# Patient Record
Sex: MALE | Race: WHITE | NOT HISPANIC OR LATINO | Employment: PART TIME | URBAN - NONMETROPOLITAN AREA
[De-identification: names, ages, dates, MRNs, and addresses within clinical notes are randomized per-mention and may not be internally consistent; named-entity substitution may affect disease eponyms.]

---
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
## 2021-01-20 ENCOUNTER — HOSPITAL ENCOUNTER (EMERGENCY)
Age: 21
Discharge: HOME | End: 2021-01-20
Payer: COMMERCIAL

## 2021-01-20 ENCOUNTER — APPOINTMENT (OUTPATIENT)
Dept: GENERAL RADIOLOGY | Age: 21
End: 2021-01-20
Payer: COMMERCIAL

## 2021-01-20 VITALS
RESPIRATION RATE: 19 BRPM | HEART RATE: 89 BPM | OXYGEN SATURATION: 94 % | TEMPERATURE: 97.9 F | WEIGHT: 200 LBS | BODY MASS INDEX: 31.32 KG/M2 | SYSTOLIC BLOOD PRESSURE: 154 MMHG | DIASTOLIC BLOOD PRESSURE: 90 MMHG

## 2021-01-20 DIAGNOSIS — S62.324A CLOSED DISPLACED FRACTURE OF SHAFT OF FOURTH METACARPAL BONE OF RIGHT HAND, INITIAL ENCOUNTER: Primary | ICD-10-CM

## 2021-01-20 PROCEDURE — 73130 X-RAY EXAM OF HAND: CPT

## 2021-01-20 PROCEDURE — 96372 THER/PROPH/DIAG INJ SC/IM: CPT

## 2021-01-20 PROCEDURE — 99282 EMERGENCY DEPT VISIT SF MDM: CPT

## 2021-01-20 PROCEDURE — 6360000002 HC RX W HCPCS: Performed by: PHYSICIAN ASSISTANT

## 2021-01-20 PROCEDURE — 29125 APPL SHORT ARM SPLINT STATIC: CPT

## 2021-01-20 RX ORDER — DEXAMETHASONE SODIUM PHOSPHATE 4 MG/ML
4 INJECTION, SOLUTION INTRA-ARTICULAR; INTRALESIONAL; INTRAMUSCULAR; INTRAVENOUS; SOFT TISSUE ONCE
Status: COMPLETED | OUTPATIENT
Start: 2021-01-20 | End: 2021-01-20

## 2021-01-20 RX ORDER — HYDROCODONE BITARTRATE AND ACETAMINOPHEN 10; 325 MG/1; MG/1
1 TABLET ORAL EVERY 6 HOURS PRN
Qty: 12 TABLET | Refills: 0 | Status: SHIPPED | OUTPATIENT
Start: 2021-01-20 | End: 2021-01-23

## 2021-01-20 RX ORDER — KETOROLAC TROMETHAMINE 30 MG/ML
60 INJECTION, SOLUTION INTRAMUSCULAR; INTRAVENOUS ONCE
Status: COMPLETED | OUTPATIENT
Start: 2021-01-20 | End: 2021-01-20

## 2021-01-20 RX ADMIN — KETOROLAC TROMETHAMINE 60 MG: 30 INJECTION, SOLUTION INTRAMUSCULAR; INTRAVENOUS at 16:22

## 2021-01-20 RX ADMIN — DEXAMETHASONE SODIUM PHOSPHATE 4 MG: 4 INJECTION, SOLUTION INTRAMUSCULAR; INTRAVENOUS at 16:21

## 2021-01-20 ASSESSMENT — ENCOUNTER SYMPTOMS
APNEA: 0
COLOR CHANGE: 0
BACK PAIN: 0
EYE DISCHARGE: 0
SHORTNESS OF BREATH: 0
COUGH: 0
PHOTOPHOBIA: 0
EYE ITCHING: 0

## 2021-01-20 NOTE — ED PROVIDER NOTES
[facility]  eMERGENCYdEPARTMENT eNCOUnter      Pt Name: Bobbi Reyes  MRN: [id number]  [place] 2000  Date of evaluation: 1/20/2021  Provider:RIGO Anderson    CHIEF COMPLAINT       Chief Complaint   Patient presents with    Hand Injury     Right; punched a wall today         HISTORY OF PRESENT ILLNESS  (Location/Symptom, Timing/Onset, Context/Setting, Quality, Duration, Modifying Factors, Severity.)   Bobbi Reyes is a 24 y.o. male who presents to the emergency department with complaints of right hand pain after punching wall. Concern for fracture at 4th/ring metacarpal pain 10/10 worse with movement. Feels no weakness but limitation to movement due to pain. HPI    Nursing Notes were reviewed and I agree. REVIEW OF SYSTEMS    (2-9 systems for level 4, 10 or more for level 5)     Review of Systems   Constitutional: Negative for activity change, appetite change, chills and fever. HENT: Negative for congestion and dental problem. Eyes: Negative for photophobia, discharge and itching. Respiratory: Negative for apnea, cough and shortness of breath. Cardiovascular: Negative for chest pain. Musculoskeletal: Positive for arthralgias and joint swelling. Negative for back pain, gait problem, myalgias and neck pain. Skin: Negative for color change, pallor and rash. Neurological: Negative for dizziness, seizures and syncope. Psychiatric/Behavioral: Negative for agitation. The patient is not nervous/anxious. Except as noted above the remainder of the review of systems was reviewed and negative. PAST MEDICAL HISTORY   History reviewed. No pertinent past medical history. SURGICAL HISTORY       Past Surgical History:   Procedure Laterality Date    WISDOM TOOTH EXTRACTION           CURRENT MEDICATIONS       Discharge Medication List as of 1/20/2021  6:11 PM          ALLERGIES     Patient has no known allergies.     FAMILY HISTORY       Family History Problem Relation Age of Onset    High Cholesterol Mother     Bipolar Disorder Father     ADHD Father           SOCIAL HISTORY       Social History     Socioeconomic History    Marital status: Single     Spouse name: None    Number of children: None    Years of education: None    Highest education level: None   Occupational History    None   Social Needs    Financial resource strain: None    Food insecurity     Worry: None     Inability: None    Transportation needs     Medical: None     Non-medical: None   Tobacco Use    Smoking status: Current Some Day Smoker    Smokeless tobacco: Current User   Substance and Sexual Activity    Alcohol use: Yes     Alcohol/week: 0.0 standard drinks     Comment: occas.  Drug use: Yes     Types: Marijuana    Sexual activity: None   Lifestyle    Physical activity     Days per week: None     Minutes per session: None    Stress: None   Relationships    Social connections     Talks on phone: None     Gets together: None     Attends [employer] service: None     Active member of club or organization: None     Attends meetings of clubs or organizations: None     Relationship status: None    Intimate partner violence     Fear of current or ex partner: None     Emotionally abused: None     Physically abused: None     Forced sexual activity: None   Other Topics Concern    None   Social History Narrative    None       SCREENINGS           PHYSICAL EXAM    (up to 7 forlevel 4, 8 or more for level 5)     ED Triage Vitals [01/20/21 1553]   BP Temp Temp src Pulse Resp SpO2 Height Weight   (!) 154/90 97.9 °F (36.6 °C) -- 89 19 94 % -- 200 lb (90.7 kg)       Physical Exam  Vitals signs and nursing note reviewed. Constitutional:       General: He is not in acute distress. Appearance: Normal appearance. He is well-developed. He is not diaphoretic. HENT:      Head: Normocephalic and atraumatic.       Right Ear: Tympanic membrane, ear canal and external ear normal. Signed by Dr Danielle Diane on 1/20/2021 4:35 PM          LABS:  Labs Reviewed - No data to display    All other labs were within normal range or notreturned as of this dictation. RE-ASSESSMENT        EMERGENCY DEPARTMENT COURSE and DIFFERENTIAL DIAGNOSIS/MDM:   Vitals:    Vitals:    01/20/21 1553   BP: (!) 154/90   Pulse: 89   Resp: 19   Temp: 97.9 °F (36.6 °C)   SpO2: 94%   Weight: 200 lb (90.7 kg)       MDM  I have made Dr. Shilpa Kellogg with orthopedics aware he wants the patient placed in an ulnar gutter splint and to see him tomorrow in the office. My attending Dr. Bere Ugarte has done any prescription for some Norco for pain control as needed. PROCEDURES:    Splint Application    Date/Time: 1/20/2021 11:49 PM  Performed by: RIGO Tobias  Authorized by: RIGO Tobias     Consent:     Consent obtained:  Verbal    Consent given by:  Patient  Pre-procedure details:     Sensation:  Normal  Procedure details:     Laterality:  Right    Location:  Hand    Hand:  R hand    Strapping: yes      Cast type:  Short arm    Splint type:  Ulnar gutter    Supplies:  Ortho-Glass  Post-procedure details:     Pain:  Improved    Sensation:  Normal    Patient tolerance of procedure: Tolerated well, no immediate complications          FINAL IMPRESSION      1. Closed displaced fracture of shaft of fourth metacarpal bone of right hand, initial encounter          DISPOSITION/PLAN   DISPOSITION Decision To Discharge 01/20/2021 06:10:42 PM      PATIENT REFERRED TO:  No follow-up provider specified. DISCHARGE MEDICATIONS:  Discharge Medication List as of 1/20/2021  6:11 PM      START taking these medications    Details   HYDROcodone-acetaminophen (LORCET HD)  MG per tablet Take 1 tablet by mouth every 6 hours as needed for Pain for up to 3 days. May cause drowsiness.   Do not take and drive., YOUH-04 tablet, R-0Normal (Please note that portions of this note were completed with a voice recognition program.  Efforts were made to edit the dictations but occasionallywords are mis-transcribed.)    Shellye Lynne [facility], [place]  01/20/21 4962

## 2021-05-13 ENCOUNTER — HOSPITAL ENCOUNTER (EMERGENCY)
Age: 21
Discharge: HOME | End: 2021-05-13
Payer: COMMERCIAL

## 2021-05-13 VITALS
DIASTOLIC BLOOD PRESSURE: 82 MMHG | BODY MASS INDEX: 31.08 KG/M2 | HEART RATE: 88 BPM | RESPIRATION RATE: 19 BRPM | HEIGHT: 67 IN | TEMPERATURE: 98.2 F | WEIGHT: 198 LBS | SYSTOLIC BLOOD PRESSURE: 139 MMHG | OXYGEN SATURATION: 95 %

## 2021-05-13 DIAGNOSIS — T14.90XD HEALING WOUND: ICD-10-CM

## 2021-05-13 DIAGNOSIS — Z51.89 VISIT FOR WOUND CHECK: Primary | ICD-10-CM

## 2021-05-13 PROCEDURE — 90471 IMMUNIZATION ADMIN: CPT | Performed by: NURSE PRACTITIONER

## 2021-05-13 PROCEDURE — 90715 TDAP VACCINE 7 YRS/> IM: CPT | Performed by: NURSE PRACTITIONER

## 2021-05-13 PROCEDURE — 99283 EMERGENCY DEPT VISIT LOW MDM: CPT

## 2021-05-13 PROCEDURE — 6360000002 HC RX W HCPCS: Performed by: NURSE PRACTITIONER

## 2021-05-13 RX ADMIN — TETANUS TOXOID, REDUCED DIPHTHERIA TOXOID AND ACELLULAR PERTUSSIS VACCINE, ADSORBED 0.5 ML: 5; 2.5; 8; 8; 2.5 SUSPENSION INTRAMUSCULAR at 19:57

## 2021-05-14 NOTE — ED PROVIDER NOTES
[facility]  eMERGENCY dEPARTMENT eNCOUnter      Pt Name: Bj Lan  MRN: [id number]  [place] 2000  Date of evaluation: 5/13/2021  Provider: MAGGIE Yan    CHIEF COMPLAINT       Chief Complaint   Patient presents with    Laceration         HISTORY OF PRESENT ILLNESS   (Location/Symptom, Timing/Onset,Context/Setting, Quality, Duration, Modifying Factors, Severity)  Note limiting factors. Andre Feliciano a 24 y.o. male who presents to the emergency department for evaluation of wound left hand. Pt tells me that he sustained wound to top of non dominant/left hand 2 days ago. He relates that the top of his hand was scraped by sharp edge of his dog's collar. He tells me that he is here for tetanus immunization up date. He denies fevers as well as constitutional symptoms of illness. He does not endorse any left hand pain, numbness or weakness. He tells me that wound continues to heal.   HPI    Nursing Notes were reviewed. REVIEW OF SYSTEMS    (2-9 systems for level 4, 10 or more for level 5)     Review of Systems   Constitutional: Negative for fever. Skin: Positive for wound (left hand). A complete review of systems was performed and is negative except as noted above in the HPI. PAST MEDICAL HISTORY   History reviewed. No pertinent past medical history. SURGICAL HISTORY       Past Surgical History:   Procedure Laterality Date    HAND SURGERY Right     WISDOM TOOTH EXTRACTION           CURRENT MEDICATIONS       There are no discharge medications for this patient. ALLERGIES     Patient has no known allergies.     FAMILY HISTORY       Family History   Problem Relation Age of Onset    High Cholesterol Mother     Bipolar Disorder Father     ADHD Father           SOCIAL HISTORY       Social History     Socioeconomic History    Marital status: Single     Spouse name: None    Number of children: None    Years of education: None    Highest education level: None are interpreted by the Emergency Department Physician who either signs or Co-signs this chart in the absence of acardiologist.        RADIOLOGY:   Non-plain film images such as CT, Ultrasound andMRI are read by the radiologist. Plain radiographic images are visualized and preliminarily interpreted by the emergency physician with the below findings:        Interpretation per the Radiologist below, if available at the time of this note:    No orders to display         ED BEDSIDE ULTRASOUND:   Performed by ED Physician - none    LABS:  Labs Reviewed - No data to display    All other labs were within normal range or not returned as of this dictation. RE-ASSESSMENT           EMERGENCY DEPARTMENT COURSE and DIFFERENTIALDIAGNOSIS/MDM:   Vitals:    Vitals:    05/13/21 1952   BP: 139/82   Pulse: 88   Resp: 19   Temp: 98.2 °F (36.8 °C)   SpO2: 95%   Weight: 198 lb (89.8 kg)   Height: 5' 7\" (1.702 m)       MDM      CONSULTS:  None    PROCEDURES:  Unless otherwise notedbelow, none     Procedures    FINAL IMPRESSION     1. Visit for wound check    2. Healing wound          DISPOSITION/PLAN   DISPOSITION Decision To Discharge 05/13/2021 08:22:41 PM      PATIENT REFERRED TO:  [address] [place] [address]  [phone]  Schedule an appointment as soon as possible for a visit   As needed      DISCHARGE MEDICATIONS:       There are no discharge medications for this patient.       (Pleasenote that portions of this note were completed with a voice recognition program.  Efforts were made to edit the dictations but occasionally words are mis-transcribed.)              Eduardo Troy, MAGGIE  05/13/21 2056

## 2021-05-30 ENCOUNTER — HOSPITAL ENCOUNTER (EMERGENCY)
Age: 21
Discharge: HOME | End: 2021-05-30
Payer: COMMERCIAL

## 2021-05-30 VITALS
HEIGHT: 67 IN | BODY MASS INDEX: 29.82 KG/M2 | TEMPERATURE: 98.1 F | DIASTOLIC BLOOD PRESSURE: 76 MMHG | RESPIRATION RATE: 20 BRPM | WEIGHT: 190 LBS | HEART RATE: 89 BPM | OXYGEN SATURATION: 97 % | SYSTOLIC BLOOD PRESSURE: 134 MMHG

## 2021-05-30 DIAGNOSIS — K13.70 UNSPECIFIED LESIONS OF ORAL MUCOSA: ICD-10-CM

## 2021-05-30 DIAGNOSIS — R09.82 POST-NASAL DRAINAGE: Primary | ICD-10-CM

## 2021-05-30 LAB — S PYO AG THROAT QL: NEGATIVE

## 2021-05-30 PROCEDURE — 99283 EMERGENCY DEPT VISIT LOW MDM: CPT

## 2021-05-30 PROCEDURE — 87880 STREP A ASSAY W/OPTIC: CPT

## 2021-05-30 PROCEDURE — 87081 CULTURE SCREEN ONLY: CPT

## 2021-05-30 RX ORDER — CLINDAMYCIN HYDROCHLORIDE 300 MG/1
300 CAPSULE ORAL 2 TIMES DAILY
Qty: 14 CAPSULE | Refills: 0 | Status: SHIPPED | OUTPATIENT
Start: 2021-05-30 | End: 2021-06-06

## 2021-05-31 ASSESSMENT — ENCOUNTER SYMPTOMS
APNEA: 0
SHORTNESS OF BREATH: 0
COLOR CHANGE: 0
BACK PAIN: 0
EYE DISCHARGE: 0
EYE ITCHING: 0
PHOTOPHOBIA: 0
SORE THROAT: 1
COUGH: 0

## 2021-05-31 NOTE — ED PROVIDER NOTES
[facility]  eMERGENCYdEPARTMENT eNCOUnter      Pt Name: Rhett Kaplan  MRN: [id number]  [place] 2000  Date of evaluation: 5/30/2021  Provider:RIGO Anderson    CHIEF COMPLAINT       Chief Complaint   Patient presents with    Mouth Lesions         HISTORY OF PRESENT ILLNESS  (Location/Symptom, Timing/Onset, Context/Setting, Quality, Duration, Modifying Factors, Severity.)   Rhett Kaplan is a 24 y.o. male who presents to the emergency department with complaints of oral lesions noticed a few days ago they are above both canines bilaterally mildly sore patient admits to sore throat with concern for peritonsillar infection. HPI    Nursing Notes were reviewed and I agree. REVIEW OF SYSTEMS    (2-9 systems for level 4, 10 or more for level 5)     Review of Systems   Constitutional: Negative for activity change, appetite change, chills and fever. HENT: Positive for sore throat. Negative for congestion and dental problem. Eyes: Negative for photophobia, discharge and itching. Respiratory: Negative for apnea, cough and shortness of breath. Cardiovascular: Negative for chest pain. Musculoskeletal: Negative for arthralgias, back pain, gait problem, myalgias and neck pain. Skin: Negative for color change, pallor and rash. Neurological: Negative for dizziness, seizures and syncope. Psychiatric/Behavioral: Negative for agitation. The patient is not nervous/anxious. Except as noted above the remainder of the review of systems was reviewed and negative. PAST MEDICAL HISTORY   History reviewed. No pertinent past medical history. SURGICAL HISTORY       Past Surgical History:   Procedure Laterality Date    HAND SURGERY Right     WISDOM TOOTH EXTRACTION           CURRENT MEDICATIONS       Discharge Medication List as of 5/30/2021  9:09 PM          ALLERGIES     Patient has no known allergies.     FAMILY HISTORY       Family History   Problem Relation Age of Onset    High Cholesterol Mother     Bipolar Disorder Father     ADHD Father           SOCIAL HISTORY       Social History     Socioeconomic History    Marital status: Single     Spouse name: None    Number of children: None    Years of education: None    Highest education level: None   Occupational History    None   Tobacco Use    Smoking status: Current Some Day Smoker    Smokeless tobacco: Current User   Substance and Sexual Activity    Alcohol use: Yes     Alcohol/week: 0.0 standard drinks     Comment: occas.  Drug use: Yes     Types: Marijuana    Sexual activity: None   Other Topics Concern    None   Social History Narrative    None     Social Determinants of Health     Financial Resource Strain:     Difficulty of Paying Living Expenses:    Food Insecurity:     Worried About Running Out of Food in the Last Year:     [address] in the Last Year:    Transportation Needs:     Lack of Transportation (Medical):  Lack of Transportation (Non-Medical):    Physical Activity:     Days of Exercise per Week:     Minutes of Exercise per Session:    Stress:     Feeling of Stress :    Social Connections:     Frequency of Communication with Friends and Family:     Frequency of Social Gatherings with Friends and Family:     Attends [employer] Services:     Active Member of Clubs or Organizations:     Attends Club or Organization Meetings:     Marital Status:    Intimate Partner Violence:     Fear of Current or Ex-Partner:     Emotionally Abused:     Physically Abused:     Sexually Abused:        SCREENINGS           PHYSICAL EXAM    (up to 7 forlevel 4, 8 or more for level 5)     ED Triage Vitals [05/30/21 2016]   BP Temp Temp src Pulse Resp SpO2 Height Weight   134/76 98.1 °F (36.7 °C) -- 89 20 97 % 5' 7\" (1.702 m) 190 lb (86.2 kg)       Physical Exam  Vitals and nursing note reviewed. Constitutional:       General: He is not in acute distress. Appearance: Normal appearance.  He is

## 2021-06-01 LAB — S PYO THROAT QL CULT: NORMAL

## 2021-06-13 ENCOUNTER — OFFICE VISIT (OUTPATIENT)
Age: 21
End: 2021-06-13

## 2021-06-13 ENCOUNTER — OFFICE VISIT (OUTPATIENT)
Dept: URGENT CARE | Age: 21
End: 2021-06-13
Payer: COMMERCIAL

## 2021-06-13 VITALS
TEMPERATURE: 98 F | RESPIRATION RATE: 16 BRPM | OXYGEN SATURATION: 98 % | BODY MASS INDEX: 30.64 KG/M2 | HEART RATE: 105 BPM | WEIGHT: 195.2 LBS | DIASTOLIC BLOOD PRESSURE: 72 MMHG | HEIGHT: 67 IN | SYSTOLIC BLOOD PRESSURE: 122 MMHG

## 2021-06-13 DIAGNOSIS — Z11.59 SCREENING FOR VIRAL DISEASE: Primary | ICD-10-CM

## 2021-06-13 DIAGNOSIS — J06.9 URI WITH COUGH AND CONGESTION: Primary | ICD-10-CM

## 2021-06-13 PROCEDURE — 99213 OFFICE O/P EST LOW 20 MIN: CPT | Performed by: NURSE PRACTITIONER

## 2021-06-13 PROCEDURE — 99999 PR OFFICE/OUTPT VISIT,PROCEDURE ONLY: CPT | Performed by: NURSE PRACTITIONER

## 2021-06-13 ASSESSMENT — ENCOUNTER SYMPTOMS
SORE THROAT: 0
SHORTNESS OF BREATH: 1
COUGH: 1

## 2021-06-13 NOTE — LETTER
[facility]  [address] [place] [address] [address] [id number]  Phone: [phone]  Fax: MAGGIE Rocha        June 13, 2021     Patient: Rhett Kaplan   YOB: 2000   Date of Visit: 6/13/2021       To Whom it May Concern:    Brinda Ndiaye was seen in my clinic on 6/13/2021. He may return to work on 06/15/2021. If you have any questions or concerns, please don't hesitate to call.     Sincerely,         MAGGIE Juarez

## 2021-06-13 NOTE — PATIENT INSTRUCTIONS
Patient Education        Upper Respiratory Infection (Cold): Care Instructions  Your Care Instructions     An upper respiratory infection, or URI, is an infection of the nose, sinuses, or throat. URIs are spread by coughs, sneezes, and direct contact. The common cold is the most frequent kind of URI. The flu and sinus infections are other kinds of URIs. Almost all URIs are caused by viruses. Antibiotics won't cure them. But you can treat most infections with home care. This may include drinking lots of fluids and taking over-the-counter pain medicine. You will probably feel better in 4 to 10 days. The doctor has checked you carefully, but problems can develop later. If you notice any problems or new symptoms, get medical treatment right away. Follow-up care is a key part of your treatment and safety. Be sure to make and go to all appointments, and call your doctor if you are having problems. It's also a good idea to know your test results and keep a list of the medicines you take. How can you care for yourself at home? · To prevent dehydration, drink plenty of fluids. Choose water and other caffeine-free clear liquids until you feel better. If you have kidney, heart, or liver disease and have to limit fluids, talk with your doctor before you increase the amount of fluids you drink. · Take an over-the-counter pain medicine, such as acetaminophen (Tylenol), ibuprofen (Advil, Motrin), or naproxen (Aleve). Read and follow all instructions on the label. · Before you use cough and cold medicines, check the label. These medicines may not be safe for young children or for people with certain health problems. · Be careful when taking over-the-counter cold or flu medicines and Tylenol at the same time. Many of these medicines have acetaminophen, which is Tylenol. Read the labels to make sure that you are not taking more than the recommended dose. Too much acetaminophen (Tylenol) can be harmful.   · Get plenty of respiratory syndrome (MERS) and severe acute respiratory syndrome (SARS). COVID-19 is caused by a novel coronavirus. That means it's a new type that has not been seen in people before. What are the symptoms? Coronavirus (COVID-19) symptoms may include:  · Fever. · Cough. · Trouble breathing. · Chills or repeated shaking with chills. · Muscle pain. · Headache. · Sore throat. · New loss of taste or smell. · Vomiting. · Diarrhea. In severe cases, COVID-19 can cause pneumonia and make it hard to breathe without help from a machine. It can cause death. How is it diagnosed? COVID-19 is diagnosed with a viral test. This may also be called a PCR test or antigen test. It looks for evidence of the virus in your breathing passages or lungs (respiratory system). The test is most often done on a sample from the nose, throat, or lungs. It's sometimes done on a sample of saliva. One way a sample is collected is by putting a long swab into the back of your nose. How is it treated? Mild cases of COVID-19 can be treated at home. Serious cases need treatment in the hospital. Treatment may include medicines to reduce symptoms, plus breathing support such as oxygen therapy or a ventilator. Some people may be placed on their belly to help their oxygen levels. Treatments that may help people who have COVID-19 include:  Antiviral medicines. These medicines treat viral infections. Remdesivir is an example. Immune-based therapy. These medicines help the immune system fight COVID-19. One example is bamlanivimab. It's a monoclonal antibody. Blood thinners. These medicines help prevent blood clots. People with severe illness are at risk for blood clots. How can you protect yourself and others? The best way to protect yourself from getting sick is to:  · Avoid areas where there is an outbreak. · Avoid contact with people who may be infected. · Avoid crowds and try to stay at least 6 feet away from other people.   · Wash your hands often, especially after you cough or sneeze. Use soap and water, and scrub for at least 20 seconds. If soap and water aren't available, use an alcohol-based hand . · Avoid touching your mouth, nose, and eyes. To help avoid spreading the virus to others:  · Stay home if you are sick or have been exposed to the virus. Don't go to school, work, or public areas. And don't use public transportation, ride-shares, or taxis unless you have no choice. · Wear a cloth face cover if you have to go to public areas. · Cover your mouth with a tissue when you cough or sneeze. Then throw the tissue in the trash and wash your hands right away. · If you're sick:  ? Leave your home only if you need to get medical care. But call the doctor's office first so they know you're coming. And wear a face cover. ? Wear the face cover whenever you're around other people. It can help stop the spread of the virus. ? Limit contact with pets and people in your home. If possible, stay in a separate bedroom and use a separate bathroom. ? Clean and disinfect your home every day. Use household  and disinfectant wipes or sprays. Take special care to clean things that you grab with your hands. These include doorknobs, remote controls, phones, and handles on your refrigerator and microwave. And don't forget countertops, tabletops, bathrooms, and computer keyboards. When should you call for help? Call 911 anytime you think you may need emergency care. For example, call if you have life-threatening symptoms, such as:    · You have severe trouble breathing. (You can't talk at all.)     · You have constant chest pain or pressure.     · You are severely dizzy or lightheaded.     · You are confused or can't think clearly.     · Your face and lips have a blue color.     · You pass out (lose consciousness) or are very hard to wake up.    Call your doctor now or seek immediate medical care if:    · You have moderate trouble breathing. (You can't speak a full sentence.)     · You are coughing up blood (more than about 1 teaspoon).     · You have signs of low blood pressure. These include feeling lightheaded; being too weak to stand; and having cold, pale, clammy skin. Watch closely for changes in your health, and be sure to contact your doctor if:    · Your symptoms get worse.     · You are not getting better as expected. Call before you go to the doctor's office. Follow their instructions. And wear a cloth face cover. Current as of: February 19, 2021               Content Version: 12.8  © 2145-8120 Healthwise, [employer]. Care instructions adapted under license by [facility] ([facility]). If you have questions about a medical condition or this instruction, always ask your healthcare professional. [address] any warranty or liability for your use of this information. 1. Rest and increase fluid intake. 2. Covid-19 testing. Quarantine at home until results are called to you. If positive you will have to quarantine for 14days. If positive the health dept will also contact you. 3. Monitor for fever and treat as needed with tylenol or ibuprofen  4. If patient is not improving or developing any new/worsening symptoms then return to clinic as needed or go to ER. Patient is to follow up with PCP as needed.

## 2021-06-13 NOTE — PROGRESS NOTES
[address] [facility]  [address] [address] [place] [address]  Dept: [phone]  Dept Fax: [phone]  Loc: [phone]    Rhett Kaplan is a 24 y.o. male who presents today for his medical conditions/complaintsas noted below. Rhett Kaplan is c/o of Shortness of Breath (onset 1 week ago ), Chills (1-2 days ago ), and Congestion        HPI:     URI   This is a new problem. The current episode started in the past 7 days. The problem has been unchanged. There has been no fever. Associated symptoms include congestion and coughing. Pertinent negatives include no sore throat. Associated symptoms comments: SOB, chills  . He has tried nothing for the symptoms. Works at [employer]. Is concerned for covid-19. No past medical history on file. Past Surgical History:   Procedure Laterality Date    HAND SURGERY Right     WISDOM TOOTH EXTRACTION         Family History   Problem Relation Age of Onset    High Cholesterol Mother     Bipolar Disorder Father     ADHD Father        Social History     Tobacco Use    Smoking status: Current Some Day Smoker    Smokeless tobacco: Current User   Substance Use Topics    Alcohol use: Yes     Alcohol/week: 0.0 standard drinks     Comment: occas. No current outpatient medications on file. No current facility-administered medications for this visit.      No Known Allergies    Health Maintenance   Topic Date Due    Hepatitis C screen  Never done    Varicella vaccine (1 of 2 - 2-dose childhood series) Never done    Pneumococcal 0-64 years Vaccine (1 of 2 - PPSV23) Never done    HPV vaccine (1 - Male 2-dose series) Never done    COVID-19 Vaccine (1) Never done    HIV screen  Never done    Flu vaccine (Season Ended) 09/01/2021    DTaP/Tdap/Td vaccine (2 - Td or Tdap) 05/13/2031    Hepatitis A vaccine  Aged Out    Hepatitis B vaccine  Aged Out    Hib vaccine  Aged Out    Meningococcal (ACWY) vaccine  Aged Out Subjective:     Review of Systems   Constitutional: Positive for chills. Negative for fever. HENT: Positive for congestion. Negative for sore throat. Respiratory: Positive for cough and shortness of breath. All other systems reviewed and are negative.      :Objective      Physical Exam  Vitals and nursing note reviewed. Constitutional:       General: He is not in acute distress. Appearance: Normal appearance. He is well-developed. He is not ill-appearing or diaphoretic. HENT:      Head: Normocephalic and atraumatic. Right Ear: Tympanic membrane, ear canal and external ear normal.      Left Ear: Tympanic membrane, ear canal and external ear normal.      Nose: Nose normal.      Mouth/Throat:      Mouth: Mucous membranes are moist.      Pharynx: Oropharynx is clear. Eyes:      Conjunctiva/sclera: Conjunctivae normal.      Pupils: Pupils are equal, round, and reactive to light. Cardiovascular:      Rate and Rhythm: Normal rate and regular rhythm. Heart sounds: Normal heart sounds. No murmur heard. Pulmonary:      Effort: Pulmonary effort is normal. No respiratory distress. Breath sounds: Normal breath sounds. No wheezing, rhonchi or rales. Skin:     General: Skin is warm and dry. Findings: No rash. Neurological:      Mental Status: He is alert and oriented to person, place, and time. Psychiatric:         Mood and Affect: Mood normal.         Behavior: Behavior normal.       /72   Pulse 105   Temp 98 °F (36.7 °C) (Temporal)   Resp 16   Ht 5' 7\" (1.702 m)   Wt 195 lb 3.2 oz (88.5 kg)   SpO2 98%   BMI 30.57 kg/m²     :Assessment       Diagnosis Orders   1. URI with cough and congestion         :Plan   1. Rest and increase fluid intake. 2. Covid-19 testing. Quarantine at home until results are called to you. If positive you will have to quarantine for 14days. If positive the health dept will also contact you.   3. Monitor for fever and treat as needed with tylenol or ibuprofen  4. If patient is not improving or developing any new/worsening symptoms then return to clinic as needed or go to ER. Patient is to follow up with PCP as needed. No orders of the defined types were placed in this encounter. No follow-ups on file. No orders of the defined types were placed in this encounter. Patient given educational materials- see patient instructions. Discussed use, benefit, and side effects of prescribedmedications. All patient questions answered. Pt voiced understanding. Patient Instructions       Patient Education        Upper Respiratory Infection (Cold): Care Instructions  Your Care Instructions     An upper respiratory infection, or URI, is an infection of the nose, sinuses, or throat. URIs are spread by coughs, sneezes, and direct contact. The common cold is the most frequent kind of URI. The flu and sinus infections are other kinds of URIs. Almost all URIs are caused by viruses. Antibiotics won't cure them. But you can treat most infections with home care. This may include drinking lots of fluids and taking over-the-counter pain medicine. You will probably feel better in 4 to 10 days. The doctor has checked you carefully, but problems can develop later. If you notice any problems or new symptoms, get medical treatment right away. Follow-up care is a key part of your treatment and safety. Be sure to make and go to all appointments, and call your doctor if you are having problems. It's also a good idea to know your test results and keep a list of the medicines you take. How can you care for yourself at home? · To prevent dehydration, drink plenty of fluids. Choose water and other caffeine-free clear liquids until you feel better. If you have kidney, heart, or liver disease and have to limit fluids, talk with your doctor before you increase the amount of fluids you drink.   · Take an over-the-counter pain medicine, such as acetaminophen (Tylenol), ibuprofen (Advil, Motrin), or naproxen (Aleve). Read and follow all instructions on the label. · Before you use cough and cold medicines, check the label. These medicines may not be safe for young children or for people with certain health problems. · Be careful when taking over-the-counter cold or flu medicines and Tylenol at the same time. Many of these medicines have acetaminophen, which is Tylenol. Read the labels to make sure that you are not taking more than the recommended dose. Too much acetaminophen (Tylenol) can be harmful. · Get plenty of rest.  · Do not smoke or allow others to smoke around you. If you need help quitting, talk to your doctor about stop-smoking programs and medicines. These can increase your chances of quitting for good. When should you call for help? Call 911 anytime you think you may need emergency care. For example, call if:    · You have severe trouble breathing. Call your doctor now or seek immediate medical care if:    · You seem to be getting much sicker.     · You have new or worse trouble breathing.     · You have a new or higher fever.     · You have a new rash. Watch closely for changes in your health, and be sure to contact your doctor if:    · You have a new symptom, such as a sore throat, an earache, or sinus pain.     · You cough more deeply or more often, especially if you notice more mucus or a change in the color of your mucus.     · You do not get better as expected. Where can you learn more? Go to https://OmbitronjuanyDenty's.healthStarGen. org and sign in to your Maximus account. Enter E920 in the [facility] box to learn more about \"Upper Respiratory Infection (Cold): Care Instructions. \"     If you do not have an account, please click on the \"Sign Up Now\" link. Current as of: October 26, 2020               Content Version: 12.8  © 5229-6289 Healthwise, Incorporated. Care instructions adapted under license by [facility] ([facility]).  If you have questions about a medical condition or this instruction, always ask your healthcare professional. [address] [address] any warranty or liability for your use of this information. Patient Education        Learning About Coronavirus [phone]  What is coronavirus (COVID-19)? COVID-19 is a disease caused by a new type of coronavirus. This illness was first found in December 2019. It has since spread worldwide. Coronaviruses are a large group of viruses. They cause the common cold. They also cause more serious illnesses like Middle East respiratory syndrome (MERS) and severe acute respiratory syndrome (SARS). COVID-19 is caused by a novel coronavirus. That means it's a new type that has not been seen in people before. What are the symptoms? Coronavirus (COVID-19) symptoms may include:  · Fever. · Cough. · Trouble breathing. · Chills or repeated shaking with chills. · Muscle pain. · Headache. · Sore throat. · New loss of taste or smell. · Vomiting. · Diarrhea. In severe cases, COVID-19 can cause pneumonia and make it hard to breathe without help from a machine. It can cause death. How is it diagnosed? COVID-19 is diagnosed with a viral test. This may also be called a PCR test or antigen test. It looks for evidence of the virus in your breathing passages or lungs (respiratory system). The test is most often done on a sample from the nose, throat, or lungs. It's sometimes done on a sample of saliva. One way a sample is collected is by putting a long swab into the back of your nose. How is it treated? Mild cases of COVID-19 can be treated at home. Serious cases need treatment in the hospital. Treatment may include medicines to reduce symptoms, plus breathing support such as oxygen therapy or a ventilator. Some people may be placed on their belly to help their oxygen levels. Treatments that may help people who have COVID-19 include:  Antiviral medicines.    These medicines treat viral infections. Remdesivir is an example. Immune-based therapy. These medicines help the immune system fight COVID-19. One example is bamlanivimab. It's a monoclonal antibody. Blood thinners. These medicines help prevent blood clots. People with severe illness are at risk for blood clots. How can you protect yourself and others? The best way to protect yourself from getting sick is to:  · Avoid areas where there is an outbreak. · Avoid contact with people who may be infected. · Avoid crowds and try to stay at least 6 feet away from other people. · Wash your hands often, especially after you cough or sneeze. Use soap and water, and scrub for at least 20 seconds. If soap and water aren't available, use an alcohol-based hand . · Avoid touching your mouth, nose, and eyes. To help avoid spreading the virus to others:  · Stay home if you are sick or have been exposed to the virus. Don't go to school, work, or public areas. And don't use public transportation, ride-shares, or taxis unless you have no choice. · Wear a cloth face cover if you have to go to public areas. · Cover your mouth with a tissue when you cough or sneeze. Then throw the tissue in the trash and wash your hands right away. · If you're sick:  ? Leave your home only if you need to get medical care. But call the doctor's office first so they know you're coming. And wear a face cover. ? Wear the face cover whenever you're around other people. It can help stop the spread of the virus. ? Limit contact with pets and people in your home. If possible, stay in a separate bedroom and use a separate bathroom. ? Clean and disinfect your home every day. Use household  and disinfectant wipes or sprays. Take special care to clean things that you grab with your hands. These include doorknobs, remote controls, phones, and handles on your refrigerator and microwave.  And don't forget countertops, tabletops, bathrooms, and computer keyboards. When should you call for help? Call 911 anytime you think you may need emergency care. For example, call if you have life-threatening symptoms, such as:    · You have severe trouble breathing. (You can't talk at all.)     · You have constant chest pain or pressure.     · You are severely dizzy or lightheaded.     · You are confused or can't think clearly.     · Your face and lips have a blue color.     · You pass out (lose consciousness) or are very hard to wake up. Call your doctor now or seek immediate medical care if:    · You have moderate trouble breathing. (You can't speak a full sentence.)     · You are coughing up blood (more than about 1 teaspoon).     · You have signs of low blood pressure. These include feeling lightheaded; being too weak to stand; and having cold, pale, clammy skin. Watch closely for changes in your health, and be sure to contact your doctor if:    · Your symptoms get worse.     · You are not getting better as expected. Call before you go to the doctor's office. Follow their instructions. And wear a cloth face cover. Current as of: February 19, 2021               Content Version: 12.8  © 8152-9029 Healthwise, [email or web address]. Care instructions adapted under license by [facility] ([facility]). If you have questions about a medical condition or this instruction, always ask your healthcare professional. [address] [address] any warranty or liability for your use of this information. 1. Rest and increase fluid intake. 2. Covid-19 testing. Quarantine at home until results are called to you. If positive you will have to quarantine for 14days. If positive the health dept will also contact you. 3. Monitor for fever and treat as needed with tylenol or ibuprofen  4. If patient is not improving or developing any new/worsening symptoms then return to clinic as needed or go to ER. Patient is to follow up with PCP as needed.                Electronically signed by Dena Lewis

## 2021-06-14 LAB — SARS-COV-2, PCR: NOT DETECTED

## 2021-08-03 ENCOUNTER — OFFICE VISIT (OUTPATIENT)
Dept: URGENT CARE | Age: 21
End: 2021-08-03
Payer: COMMERCIAL

## 2021-08-03 VITALS
BODY MASS INDEX: 32.73 KG/M2 | RESPIRATION RATE: 18 BRPM | TEMPERATURE: 98.6 F | OXYGEN SATURATION: 97 % | SYSTOLIC BLOOD PRESSURE: 134 MMHG | HEART RATE: 81 BPM | DIASTOLIC BLOOD PRESSURE: 69 MMHG | WEIGHT: 209 LBS

## 2021-08-03 DIAGNOSIS — J30.9 ALLERGIC RHINITIS, UNSPECIFIED SEASONALITY, UNSPECIFIED TRIGGER: Primary | ICD-10-CM

## 2021-08-03 PROCEDURE — 99213 OFFICE O/P EST LOW 20 MIN: CPT | Performed by: NURSE PRACTITIONER

## 2021-08-03 RX ORDER — CETIRIZINE HYDROCHLORIDE 10 MG/1
10 TABLET ORAL DAILY
Qty: 30 TABLET | Refills: 0 | Status: SHIPPED | OUTPATIENT
Start: 2021-08-03 | End: 2021-09-02

## 2021-08-03 RX ORDER — GUAIFENESIN 100 MG/5ML
10 SYRUP ORAL EVERY 4 HOURS PRN
Qty: 1 BOTTLE | Refills: 0 | Status: SHIPPED | OUTPATIENT
Start: 2021-08-03

## 2021-08-03 ASSESSMENT — ENCOUNTER SYMPTOMS
EYE PAIN: 0
BACK PAIN: 0
VOMITING: 0
COUGH: 1
CHEST TIGHTNESS: 0
SHORTNESS OF BREATH: 0
SORE THROAT: 0
RHINORRHEA: 1
NAUSEA: 0
ABDOMINAL DISTENTION: 0
COLOR CHANGE: 0
EYE DISCHARGE: 0
ABDOMINAL PAIN: 0
WHEEZING: 0

## 2021-08-03 NOTE — LETTER
[id number] [facility]  [address] [place] [address] [address] [address]  Phone: [phone]  Fax: MAGGIE Gabriel CNP        August 3, 2021     Patient: Elizabeth Altamirano   YOB: 2000   Date of Visit: 8/3/2021       To Whom it May Concern:    Sarah Guerrero was seen in my clinic on 8/3/2021. He may return to work on 8/4/2021. If you have any questions or concerns, please don't hesitate to call.     Sincerely,         Marc Goltz, APRN - CNP

## 2021-08-03 NOTE — PATIENT INSTRUCTIONS
Take antihistamine as prescribed  Take Robitussin (decongestant) as prescribed  Increase fluids and rest  Return to clinic if symptoms worsen or fail to improve. Follow up with your primary care provider as needed      Patient Education        Managing Your Allergies: Care Instructions  Your Care Instructions     Managing your allergies is an important part of staying healthy. Your doctor will help you find out what may be the cause of the allergies. Common causes of symptoms are house dust and dust mites, animal dander, mold, and pollen. As soon as you know what triggers your symptoms, try to avoid those things. This can help prevent allergy symptoms, asthma, and other health problems. Ask your doctor about allergy medicine or immunotherapy. These treatments may help reduce or prevent allergy symptoms. Follow-up care is a key part of your treatment and safety. Be sure to make and go to all appointments, and call your doctor if you are having problems. It's also a good idea to know your test results and keep a list of the medicines you take. How can you care for yourself at home? · If you have been told by your doctor that dust or dust mites are causing your allergy, decrease the dust around your bed:  ? Wash sheets, pillowcases, and other bedding in hot water every week. ? Use dust-proof covers for pillows, duvets, and mattresses. Avoid plastic covers because they tear easily and do not \"breathe. \" Wash as instructed on the label. ? Do not use any blankets and pillows that you do not need. ? Use blankets that you can wash in your washing machine. ? Consider removing drapes and carpets, which attract and hold dust, from your bedroom. · If you are allergic to house dust and mites, do not use home humidifiers. Your doctor can suggest ways you can control dust and mites. · Look for signs of cockroaches. Cockroaches cause allergic reactions. Use cockroach baits to get rid of them.  Then, clean your home well. Cockroaches like areas where grocery bags, newspapers, empty bottles, or cardboard boxes are stored. Do not keep these inside your home, and keep trash and food containers sealed. Seal off any spots where cockroaches might enter your home. · If you are allergic to mold, get rid of furniture, rugs, and drapes that smell musty. Check for mold in the bathroom. · If you are allergic to outdoor pollen or mold spores, use air-conditioning. Change or clean all filters every month. Keep windows closed. · If you are allergic to pollen, stay inside when pollen counts are high. Use a vacuum  with a HEPA filter or a double-thickness filter at least two times each week. · Stay inside when air pollution is bad. Avoid paint fumes, perfumes, and other strong odors. · Avoid conditions that make your allergies worse. Stay away from smoke. Do not smoke or let anyone else smoke in your house. Do not use fireplaces or wood-burning stoves. · If you are allergic to your pets, change the air filter in your furnace every month. Use high-efficiency filters. · If you are allergic to pet dander, keep pets outside or out of your bedroom. Old carpet and cloth furniture can hold a lot of animal dander. You may need to replace them. When should you call for help? Give an epinephrine shot if:    · You think you are having a severe allergic reaction. After giving an epinephrine shot call 911, even if you feel better. Call 911 if:    · You have symptoms of a severe allergic reaction. These may include:  ? Sudden raised, red areas (hives) all over your body. ? Swelling of the throat, mouth, lips, or tongue. ? Trouble breathing. ? Passing out (losing consciousness). Or you may feel very lightheaded or suddenly feel weak, confused, or restless.     · You have been given an epinephrine shot, even if you feel better.    Call your doctor now or seek immediate medical care if:    · You have symptoms of an allergic reaction, such as:  ? A rash or hives (raised, red areas on the skin). ? Itching. ? Swelling. ? Belly pain, nausea, or vomiting. Watch closely for changes in your health, and be sure to contact your doctor if:    · Your allergies get worse.     · You need help controlling your allergies.     · You have questions about allergy testing.     · You do not get better as expected. Where can you learn more? Go to https://StrataCloudpeDomin-8 Enterprise Solutionseb.NuORDER. org and sign in to your [employer] account. Enter L249 in the LinkCloud box to learn more about \"Managing Your Allergies: Care Instructions. \"     If you do not have an account, please click on the \"Sign Up Now\" link. Current as of: February 10, 2021               Content Version: 12.9  © 1316-3662 Healthwise, Incorporated. Care instructions adapted under license by [facility] ([facility]). If you have questions about a medical condition or this instruction, always ask your healthcare professional. [address] [address] any warranty or liability for your use of this information.

## 2021-08-03 NOTE — PROGRESS NOTES
[address] [facility]  [address] [address] [place] [address]  Dept: [phone]  Dept Fax: [phone]  Loc: [phone]  Michelle Licona is a 24 y.o. male who presents today for his medical conditions/complaintsas noted below. Michelle Licona is c/o of Head Congestion and Drainage      HPI:     Cough  This is a new problem. The current episode started in the past 7 days. The problem has been unchanged. The cough is non-productive. Associated symptoms include nasal congestion, postnasal drip and rhinorrhea. Pertinent negatives include no chest pain, chills, ear congestion, ear pain, fever, headaches, sore throat, shortness of breath or wheezing. Nothing aggravates the symptoms. He has tried OTC cough suppressant for the symptoms. The treatment provided no relief. There is no history of asthma, COPD, emphysema or pneumonia. History reviewed. No pertinent past medical history. Past Surgical History:   Procedure Laterality Date    HAND SURGERY Right     WISDOM TOOTH EXTRACTION         Family History   Problem Relation Age of Onset    High Cholesterol Mother     Bipolar Disorder Father     ADHD Father        Social History     Tobacco Use    Smoking status: Current Some Day Smoker    Smokeless tobacco: Current User   Substance Use Topics    Alcohol use: Yes     Alcohol/week: 0.0 standard drinks     Comment: occas. No current outpatient medications on file. No current facility-administered medications for this visit.      No Known Allergies    Health Maintenance   Topic Date Due    Hepatitis C screen  Never done    Varicella vaccine (1 of 2 - 2-dose childhood series) Never done    Pneumococcal 0-64 years Vaccine (1 of 2 - PPSV23) Never done    HPV vaccine (1 - Male 2-dose series) Never done    COVID-19 Vaccine (1) Never done    HIV screen  Never done    Flu vaccine (1) 09/01/2021    DTaP/Tdap/Td vaccine (2 - Td or Tdap) 05/13/2031    Hepatitis A vaccine  Aged Out    Hepatitis B vaccine  Aged Out    Hib vaccine  Aged Out    Meningococcal (ACWY) vaccine  Aged Out       Subjective:     Review of Systems   Constitutional: Negative for appetite change, chills and fever. HENT: Positive for congestion, postnasal drip, rhinorrhea and sneezing. Negative for ear pain, mouth sores and sore throat. Eyes: Negative for pain and discharge. Respiratory: Positive for cough. Negative for chest tightness, shortness of breath and wheezing. Cardiovascular: Negative for chest pain, palpitations and leg swelling. Gastrointestinal: Negative for abdominal distention, abdominal pain, nausea and vomiting. Genitourinary: Negative. Musculoskeletal: Negative for back pain, neck pain and neck stiffness. Skin: Negative for color change. Neurological: Negative for dizziness, weakness, light-headedness, numbness and headaches. Objective:     Physical Exam  Vitals and nursing note reviewed. Constitutional:       General: He is not in acute distress. HENT:      Head: Normocephalic and atraumatic. Right Ear: Tympanic membrane, ear canal and external ear normal. There is no impacted cerumen. Left Ear: Tympanic membrane, ear canal and external ear normal. There is no impacted cerumen. Nose: Congestion and rhinorrhea present. Mouth/Throat:      Mouth: Mucous membranes are moist.      Pharynx: Posterior oropharyngeal erythema present. No oropharyngeal exudate. Eyes:      General:         Right eye: No discharge. Left eye: No discharge. Extraocular Movements: Extraocular movements intact. Conjunctiva/sclera: Conjunctivae normal.      Pupils: Pupils are equal, round, and reactive to light. Cardiovascular:      Rate and Rhythm: Normal rate and regular rhythm. Pulses: Normal pulses. Heart sounds: Normal heart sounds. No murmur heard. Pulmonary:      Effort: Pulmonary effort is normal. No respiratory distress. Breath sounds: Normal breath sounds. No wheezing or rhonchi. Abdominal:      General: Bowel sounds are normal. There is no distension. Palpations: Abdomen is soft. Tenderness: There is no abdominal tenderness. Musculoskeletal:         General: Normal range of motion. Cervical back: Normal range of motion. Skin:     General: Skin is warm and dry. Capillary Refill: Capillary refill takes less than 2 seconds. Neurological:      General: No focal deficit present. Mental Status: He is alert and oriented to person, place, and time. Psychiatric:         Behavior: Behavior normal.       /69   Pulse 81   Temp 98.6 °F (37 °C) (Temporal)   Resp 18   Wt 209 lb (94.8 kg)   SpO2 97%   BMI 32.73 kg/m²     Assessment:     Plan:    No orders of the defined types were placed in this encounter. Discussed diagnosis, expected course, and proper use of prescribed medication   Discussed lifestyle modifications that may be beneficial for her symptoms. Discussed signs and symptoms adverse reaction to medication that needs medical attention  All questions were answered and patient voiced understanding and agreement with plan of care. No follow-ups on file. No orders of the defined types were placed in this encounter. Patient given educationalmaterials - see patient instructions. Discussed use, benefit, and side effectsof prescribed medications. All patient questions answered. Pt voiced understanding. Reviewed health maintenance. Instructed to continue current medications, diet andexercise. Patient agreed with treatment plan. Follow up as directed. There are no Patient Instructions on file for this visit.       Electronically signed by MAGGIE Carreon CNP on 8/3/2021 at 10:36 AM

## 2021-09-21 ENCOUNTER — LAB (OUTPATIENT)
Dept: LAB | Facility: HOSPITAL | Age: 21
End: 2021-09-21

## 2021-09-21 ENCOUNTER — TELEMEDICINE (OUTPATIENT)
Dept: FAMILY MEDICINE CLINIC | Facility: TELEHEALTH | Age: 21
End: 2021-09-21

## 2021-09-21 DIAGNOSIS — Z11.52 ENCOUNTER FOR SCREENING FOR COVID-19: ICD-10-CM

## 2021-09-21 DIAGNOSIS — Z11.52 ENCOUNTER FOR SCREENING FOR COVID-19: Primary | ICD-10-CM

## 2021-09-21 LAB — SARS-COV-2 RNA PNL SPEC NAA+PROBE: NOT DETECTED

## 2021-09-21 PROCEDURE — C9803 HOPD COVID-19 SPEC COLLECT: HCPCS

## 2021-09-21 PROCEDURE — 87635 SARS-COV-2 COVID-19 AMP PRB: CPT

## 2021-09-21 PROCEDURE — 99202 OFFICE O/P NEW SF 15 MIN: CPT | Performed by: NURSE PRACTITIONER

## 2021-09-21 NOTE — PATIENT INSTRUCTIONS
[facility] scheduling center will call you to schedule your COVID test.  We will call you with the results from a BLOCKED NUMBER, usually within 1-3 days.           COVID-19: What Your Test Results Mean  If you test positive for COVID-19  Take steps to help prevent the spread of COVID-19  Stay home.   Do not leave your home, except to get medical care. Do not visit public areas.  Get rest and stay hydrated.  Take over-the-counter medicines, such as acetaminophen, to help you feel better.  Stay in touch with your doctor.  Separate yourself from other people.   As much as possible, stay in a specific room and away from other people and pets in your home.  If you test negative for COVID-19  · You probably were not infected at the time your sample was collected.  · However, that does not mean you will not get sick.  · It is possible that you were very early in your infection when your sample was collected and that you could test positive later.  A negative test result does not mean you won't get sick later.  cdc.gov/coronavirus  05/30/2020  This information is not intended to replace advice given to you by your health care provider. Make sure you discuss any questions you have with your health care provider.  Document Revised: 07/13/2021 Document Reviewed: 07/13/2021  Elsevier Patient Education © 2021 [employer]

## 2021-09-21 NOTE — PROGRESS NOTES
Subjective   Chief Complaint   Patient presents with   • covid screening       Juan Dan is a 21 y.o. male.     Pt presents for Covid testing. He is starting a new job tomorrow and needs a test done prior to starting. He has no symptoms. He has received 1 dose of the COVID vaccine 2 weeks ago.        No Known Allergies    History reviewed. No pertinent past medical history.    History reviewed. No pertinent surgical history.    Social History     Socioeconomic History   • Marital status: Single     Spouse name: Not on file   • Number of children: Not on file   • Years of education: Not on file   • Highest education level: Not on file       History reviewed. No pertinent family history.    No current outpatient medications on file.      Review of Systems   Constitutional: Negative for chills, diaphoresis, fatigue and fever.   HENT: Negative.    Respiratory: Negative for cough, chest tightness, shortness of breath and wheezing.    Gastrointestinal: Negative.    Musculoskeletal: Negative for myalgias.   Neurological: Negative for headache.        There were no vitals filed for this visit.    Objective   Physical Exam  Constitutional:       General: He is not in acute distress.     Appearance: Normal appearance. He is not ill-appearing, toxic-appearing or diaphoretic.   HENT:      Head: Normocephalic.      Mouth/Throat:      Lips: Pink.      Mouth: Mucous membranes are moist.   Pulmonary:      Effort: Pulmonary effort is normal.   Neurological:      Mental Status: He is alert and oriented to person, place, and time.   Psychiatric:         Mood and Affect: Mood normal.         Behavior: Behavior normal.          Procedures     Assessment/Plan   Diagnoses and all orders for this visit:    1. Encounter for screening for COVID-19 (Primary)  -     COVID PRE-OP / PRE-PROCEDURE SCREENING ORDER (NO ISOLATION) - Swab, Nasopharynx; Future    [facility] scheduling center will call you to schedule your COVID test.  We will call you with  the results from a BLOCKED NUMBER, usually within 1-3 days.           PLAN: Discussed dosing, side effects, recommended other symptomatic care.  Patient should follow up with primary care provider if symptoms worsen, fail to resolve or other symptoms need attention. Patient/family agree to the above.     I spent 10 minutes caring for Juan on this date of service. This time includes time spent by me in the following activities:preparing for the visit, obtaining and/or reviewing a separately obtained history, performing a medically appropriate examination and/or evaluation , counseling and educating the patient/family/caregiver, ordering medications, tests, or procedures and documenting information in the medical record    LAURA Aden     This visit was performed via Telehealth.  This patient has been instructed to follow-up with their primary care provider if their symptoms worsen or the treatment provided does not resolve their illness.

## 2021-11-09 ENCOUNTER — OFFICE VISIT (OUTPATIENT)
Dept: URGENT CARE | Age: 21
End: 2021-11-09
Payer: COMMERCIAL

## 2021-11-09 VITALS
WEIGHT: 221 LBS | BODY MASS INDEX: 33.49 KG/M2 | OXYGEN SATURATION: 100 % | RESPIRATION RATE: 20 BRPM | SYSTOLIC BLOOD PRESSURE: 127 MMHG | HEART RATE: 96 BPM | DIASTOLIC BLOOD PRESSURE: 80 MMHG | HEIGHT: 68 IN | TEMPERATURE: 99 F

## 2021-11-09 DIAGNOSIS — Z11.59 SCREENING FOR VIRAL DISEASE: Primary | ICD-10-CM

## 2021-11-09 LAB — SARS-COV-2, PCR: NOT DETECTED

## 2021-11-09 PROCEDURE — 99202 OFFICE O/P NEW SF 15 MIN: CPT | Performed by: NURSE PRACTITIONER

## 2024-11-07 ENCOUNTER — TELEMEDICINE (OUTPATIENT)
Dept: FAMILY MEDICINE CLINIC | Facility: TELEHEALTH | Age: 24
End: 2024-11-07
Payer: COMMERCIAL

## 2024-11-07 DIAGNOSIS — R11.2 NAUSEA AND VOMITING, UNSPECIFIED VOMITING TYPE: Primary | ICD-10-CM

## 2024-11-07 RX ORDER — ONDANSETRON 8 MG/1
8 TABLET, ORALLY DISINTEGRATING ORAL EVERY 8 HOURS PRN
Qty: 15 TABLET | Refills: 0 | Status: SHIPPED | OUTPATIENT
Start: 2024-11-07

## 2024-11-07 NOTE — LETTER
November 7, 2024     Patient: Juan Dan   YOB: 2000   Date of Visit: 11/7/2024       To Whom It May Concern:    It is my medical opinion that Juan Dan  should be excused from work 11/7/24 and 11/8/24 .           Sincerely,        LAURA Owens    CC: No Recipients

## 2024-11-08 NOTE — PROGRESS NOTES
AUSTEN Dan is a 24 y.o. male  presents with complaint of N/V since 11/5. Has been able to drink water, gatorade, and gingerale. Last BM 11/4, then had BM today and it was solid. Has been getting hot and cold. Denies URI symptoms, diarrhea, bloody stool/vomit. No known sick contacts, history of GI issues/surgeries. Has taken pepto which provides mild relief.     Review of Systems    No past medical history on file.    No family history on file.    Social History     Socioeconomic History    Marital status: Single         There were no vitals taken for this visit.    PHYSICAL EXAM  Physical Exam   Constitutional: He appears well-developed and well-nourished.   HENT:   Head: Normocephalic.   Nose: Nose normal.   Neck: Neck normal appearance.  Pulmonary/Chest: Effort normal.   Neurological: He is alert.   Psychiatric: He has a normal mood and affect. His speech is normal.       Diagnoses and all orders for this visit:    1. Nausea and vomiting, unspecified vomiting type (Primary)  -     ondansetron ODT (ZOFRAN-ODT) 8 MG disintegrating tablet; Place 1 tablet on the tongue Every 8 (Eight) Hours As Needed for Nausea or Vomiting.  Dispense: 15 tablet; Refill: 0    BRAT diet. Increase fluids, advance as tolerated.       FOLLOW-UP  As discussed during visit with [facility], if symptoms worsen or fail to improve, follow-up with PCP/Urgent Care/Emergency Department.    Patient verbalizes understanding of medications, instructions for treatment and follow-up.    Pooja Allynkiera Cantrell, APRN  11/07/2024  21:03 EST      Mode of Visit: Video  Location of patient: -HOME-  Location of provider: Home  You have chosen to receive care through a telehealth visit.  The patient has signed the video visit consent form.  The visit included audio and video interaction. No technical issues occurred during this visit.

## 2025-03-05 ENCOUNTER — TELEMEDICINE (OUTPATIENT)
Dept: FAMILY MEDICINE CLINIC | Facility: TELEHEALTH | Age: 25
End: 2025-03-05
Payer: COMMERCIAL

## 2025-03-05 DIAGNOSIS — J02.9 EXUDATIVE PHARYNGITIS: Primary | ICD-10-CM

## 2025-03-05 PROCEDURE — 99213 OFFICE O/P EST LOW 20 MIN: CPT | Performed by: NURSE PRACTITIONER

## 2025-03-05 RX ORDER — PREDNISONE 20 MG/1
20 TABLET ORAL 2 TIMES DAILY
Qty: 10 TABLET | Refills: 0 | Status: SHIPPED | OUTPATIENT
Start: 2025-03-05 | End: 2025-03-10

## 2025-03-05 RX ORDER — AMOXICILLIN 500 MG/1
500 CAPSULE ORAL 2 TIMES DAILY
Qty: 20 CAPSULE | Refills: 0 | Status: SHIPPED | OUTPATIENT
Start: 2025-03-05 | End: 2025-03-15

## 2025-03-05 NOTE — PROGRESS NOTES
Subjective   Chief Complaint   Patient presents with    Sore Throat       Juan Dan is a 25 y.o. male.     History of Present Illness  Patient reports very sore throat that started 2 days ago.  He is now experiencing increased throat swelling and has white spots on the tonsils. No other symptoms. Possible illness exposure at work.  Sore Throat   This is a new problem. Episode onset: 2 days. The problem has been unchanged. There has been no fever. Pertinent negatives include no abdominal pain, congestion, coughing, diarrhea, drooling, ear pain, headaches, hoarse voice, neck pain, shortness of breath or vomiting. He has tried nothing for the symptoms.        No Known Allergies    History reviewed. No pertinent past medical history.    History reviewed. No pertinent surgical history.    Social History     Socioeconomic History    Marital status: Single   Tobacco Use    Smoking status: Every Day    Smokeless tobacco: Never   Vaping Use    Vaping status: Every Day       History reviewed. No pertinent family history.      Current Outpatient Medications:     amoxicillin (AMOXIL) 500 MG capsule, Take 1 capsule by mouth 2 (Two) Times a Day for 10 days., Disp: 20 capsule, Rfl: 0    predniSONE (DELTASONE) 20 MG tablet, Take 1 tablet by mouth 2 (Two) Times a Day for 5 days., Disp: 10 tablet, Rfl: 0      Review of Systems   Constitutional:  Positive for fatigue. Negative for chills, diaphoresis and fever.   HENT:  Positive for sore throat. Negative for congestion, drooling, ear pain and hoarse voice.    Respiratory:  Negative for cough and shortness of breath.    Gastrointestinal:  Negative for abdominal pain, diarrhea and vomiting.   Musculoskeletal:  Negative for neck pain.   Neurological:  Negative for headache.        There were no vitals filed for this visit.    Objective   Physical Exam  Constitutional:       General: He is not in acute distress.     Appearance: Normal appearance. He is not ill-appearing, toxic-appearing  or diaphoretic.   HENT:      Head: Normocephalic.      Nose: Nose normal.      Mouth/Throat:      Lips: Pink.      Mouth: Mucous membranes are moist.      Pharynx: Pharyngeal swelling and posterior oropharyngeal erythema present.      Tonsils: Tonsillar exudate present.      Comments: Per pt    Pulmonary:      Effort: Pulmonary effort is normal.   Neurological:      Mental Status: He is alert and oriented to person, place, and time.          Procedures     Assessment & Plan   Diagnoses and all orders for this visit:    1. Exudative pharyngitis (Primary)  -     amoxicillin (AMOXIL) 500 MG capsule; Take 1 capsule by mouth 2 (Two) Times a Day for 10 days.  Dispense: 20 capsule; Refill: 0  -     predniSONE (DELTASONE) 20 MG tablet; Take 1 tablet by mouth 2 (Two) Times a Day for 5 days.  Dispense: 10 tablet; Refill: 0    Change toothbrush after couple days on antibiotics.  Tylenol for pain and/or fever, stay hydrated and rest.     If symptoms worsen or do not improve follow up with your PCP or visit your nearest Urgent Care Center or ER.        PLAN: Discussed dosing, side effects, recommended other symptomatic care.  Patient should follow up with primary care provider, Urgent Care or ER if symptoms worsen, fail to resolve or other symptoms need attention. Patient/family agree to the above.         LAURA Aden     Mode of Visit: Video  Location of patient: -HOME-  Location of provider: +HOME+  You have chosen to receive care through a telehealth visit.  The patient has signed the video visit consent form.  The visit included audio and video interaction. No technical issues occurred during this visit.    The use of a video visit has been reviewed with the patient and verbal informed consent has been obtained. Myself and Juan Dan participated in this visit. The patient is located at [address] [place] [address] [address] [facility]. I am located in [place], [place]. Mychart and Zoom were utilized.        This visit was performed  via Telehealth.  This patient has been instructed to follow-up with their primary care provider if their symptoms worsen or the treatment provided does not resolve their illness.

## 2025-03-05 NOTE — PATIENT INSTRUCTIONS
Change toothbrush after couple days on antibiotics.  Tylenol for pain and/or fever, stay hydrated and rest.     If symptoms worsen or do not improve follow up with your PCP or visit your nearest Urgent Care Center or ER.